# Patient Record
Sex: MALE | Race: WHITE
[De-identification: names, ages, dates, MRNs, and addresses within clinical notes are randomized per-mention and may not be internally consistent; named-entity substitution may affect disease eponyms.]

---

## 2017-05-31 ENCOUNTER — HOSPITAL ENCOUNTER (OUTPATIENT)
Dept: HOSPITAL 75 - RAD | Age: 42
End: 2017-05-31
Attending: ORTHOPAEDIC SURGERY
Payer: COMMERCIAL

## 2017-05-31 DIAGNOSIS — M23.242: ICD-10-CM

## 2017-05-31 DIAGNOSIS — M23.222: ICD-10-CM

## 2017-05-31 DIAGNOSIS — M23.262: Primary | ICD-10-CM

## 2017-05-31 DIAGNOSIS — M67.462: ICD-10-CM

## 2017-05-31 PROCEDURE — 73721 MRI JNT OF LWR EXTRE W/O DYE: CPT

## 2017-05-31 NOTE — DIAGNOSTIC IMAGING REPORT
PROCEDURE: MRI right joint lower extremity without contrast.



TECHNIQUE: Multiplanar, multisequence non contrast-enhanced MRI

of the right lower extremity was accomplished.



INDICATION: Right knee pain.



FINDINGS:

There is a small suprapatellar effusion. There is increased

signal in the popliteus muscle which could be posttraumatic

related to a sprain. There is no full-thickness tear. Abutting

the lateral aspect of the myotendinous junction of the popliteus,

there is a cystic area measuring 1.6 x 1 x 3 cm, which could be

sequela of the injury or ganglion cyst. The distal popliteus

tendon has normal insertion with increased signal, however,

probably related to prior injury.



The ACL demonstrates increased signal with fluid intensity mostly

proximally suggestive of mucinous degeneration and possible

element of old injury. The PCL demonstrates slight increased

signal and thickening also suggestive of old injury.



There is a complex tear involving the posterior horn of the

medial meniscus with a gap in the meniscus material measuring 3

mm in the posterior central aspect. The body of the medial

meniscus also demonstrates a nondisplaced tear. The lateral

meniscus demonstrates an oblique undersurface tear in the body of

the meniscus extending to the anterior horn. The posterior horn

appears intact. The lateral collateral ligament complex and the

MCL appear intact.



There is only mild cartilage thinning in the medial and to a

lesser extent in the lateral compartments with minimal cartilage

fissuring. Minimal reactive subchondral marrow signal abnormality

is seen in the lateral tibial plateau.



The extensor mechanism is intact. No Baker's cyst.



IMPRESSION:

1. Popliteus muscle sprain. There is a ganglion cyst abutting the

lateral aspect of the popliteus myotendinous junction.

2. Complex tears involving the posterior horn of the medial

meniscus. There are also tears in the medial meniscus body

segment and in the body and anterior horn of the lateral

meniscus. Other findings as above.



Dictated by: 



  Dictated on workstation # [id number]

## 2020-11-15 ENCOUNTER — HOSPITAL ENCOUNTER (EMERGENCY)
Dept: HOSPITAL 75 - ER | Age: 45
Discharge: HOME | End: 2020-11-15
Payer: COMMERCIAL

## 2020-11-15 VITALS — BODY MASS INDEX: 32.25 KG/M2 | HEIGHT: 74.02 IN | WEIGHT: 251.33 LBS

## 2020-11-15 VITALS — DIASTOLIC BLOOD PRESSURE: 87 MMHG | SYSTOLIC BLOOD PRESSURE: 141 MMHG
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
## 2020-11-15 DIAGNOSIS — S16.1XXA: Primary | ICD-10-CM

## 2020-11-15 LAB
ALBUMIN SERPL-MCNC: 4 GM/DL (ref 3.2–4.5)
ALP SERPL-CCNC: 64 U/L (ref 40–136)
ALT SERPL-CCNC: 89 U/L (ref 0–55)
APTT BLD: 31 SEC (ref 24–35)
BASOPHILS # BLD AUTO: 0.1 10^3/UL (ref 0–0.1)
BASOPHILS NFR BLD AUTO: 1 % (ref 0–10)
BILIRUB SERPL-MCNC: 0.8 MG/DL (ref 0.1–1)
BUN/CREAT SERPL: 8
CALCIUM SERPL-MCNC: 9.4 MG/DL (ref 8.5–10.1)
CHLORIDE SERPL-SCNC: 99 MMOL/L (ref 98–107)
CO2 SERPL-SCNC: 27 MMOL/L (ref 21–32)
CREAT SERPL-MCNC: 1 MG/DL (ref 0.6–1.3)
D DIMER PPP FEU-MCNC: 0.38 UG/ML (ref 0–0.49)
EOSINOPHIL # BLD AUTO: 0.1 10^3/UL (ref 0–0.3)
EOSINOPHIL NFR BLD AUTO: 1 % (ref 0–10)
GFR SERPLBLD BASED ON 1.73 SQ M-ARVRAT: > 60 ML/MIN
GLUCOSE SERPL-MCNC: 255 MG/DL (ref 70–105)
HCT VFR BLD CALC: 48 % (ref 40–54)
HGB BLD-MCNC: 15.7 G/DL (ref 13.3–17.7)
INR PPP: 1.1 (ref 0.8–1.4)
LYMPHOCYTES # BLD AUTO: 2.2 10^3/UL (ref 1–4)
LYMPHOCYTES NFR BLD AUTO: 20 % (ref 12–44)
MAGNESIUM SERPL-MCNC: 2.1 MG/DL (ref 1.6–2.4)
MANUAL DIFFERENTIAL PERFORMED BLD QL: NO
MCH RBC QN AUTO: 29 PG (ref 25–34)
MCHC RBC AUTO-ENTMCNC: 32 G/DL (ref 32–36)
MCV RBC AUTO: 90 FL (ref 80–99)
MONOCYTES # BLD AUTO: 0.9 10^3/UL (ref 0–1)
MONOCYTES NFR BLD AUTO: 8 % (ref 0–12)
NEUTROPHILS # BLD AUTO: 7.5 10^3/UL (ref 1.8–7.8)
NEUTROPHILS NFR BLD AUTO: 70 % (ref 42–75)
PLATELET # BLD: 250 10^3/UL (ref 130–400)
PMV BLD AUTO: 11.2 FL (ref 9–12.2)
POTASSIUM SERPL-SCNC: 4.1 MMOL/L (ref 3.6–5)
PROT SERPL-MCNC: 7.3 GM/DL (ref 6.4–8.2)
PROTHROMBIN TIME: 14.3 SEC (ref 12.2–14.7)
SODIUM SERPL-SCNC: 137 MMOL/L (ref 135–145)
WBC # BLD AUTO: 10.7 10^3/UL (ref 4.3–11)

## 2020-11-15 PROCEDURE — 93041 RHYTHM ECG TRACING: CPT

## 2020-11-15 PROCEDURE — 85025 COMPLETE CBC W/AUTO DIFF WBC: CPT

## 2020-11-15 PROCEDURE — 83874 ASSAY OF MYOGLOBIN: CPT

## 2020-11-15 PROCEDURE — 73030 X-RAY EXAM OF SHOULDER: CPT

## 2020-11-15 PROCEDURE — 85610 PROTHROMBIN TIME: CPT

## 2020-11-15 PROCEDURE — 85730 THROMBOPLASTIN TIME PARTIAL: CPT

## 2020-11-15 PROCEDURE — 80053 COMPREHEN METABOLIC PANEL: CPT

## 2020-11-15 PROCEDURE — 85379 FIBRIN DEGRADATION QUANT: CPT

## 2020-11-15 PROCEDURE — 83735 ASSAY OF MAGNESIUM: CPT

## 2020-11-15 PROCEDURE — 71045 X-RAY EXAM CHEST 1 VIEW: CPT

## 2020-11-15 PROCEDURE — 84484 ASSAY OF TROPONIN QUANT: CPT

## 2020-11-15 PROCEDURE — 36415 COLL VENOUS BLD VENIPUNCTURE: CPT

## 2020-11-15 NOTE — DIAGNOSTIC IMAGING REPORT
INDICATION: Chest pain.



Time of exam 2:20 PM



No prior studies are available for comparison.



Heart size is normal. Lungs are clear apart from a calcified

granuloma in the right lung base. There is no effusion or

pneumothorax.



IMPRESSION: No acute cardiopulmonary process is detected.



Dictated by: 



  Dictated on workstation # [id number]

## 2020-11-15 NOTE — ED GENERAL
General


Chief Complaint:  General Problems/Pain


Stated Complaint:  L SIDE SHOULDER/NECK PAIN AND SWELLING


Nursing Triage Note:  


Patient reports waking up yesterday morning with L shoulder, neck, back, and 


chest pain that is worse with movement. Patient states that it has got 


progressively worse. Took ASA to help with the pain but it did not relieve it. 


Denies SOA, fatigue, nausea


Nursing Sepsis Screen:  No Definite Risk





History of Present Illness


Date Seen by Provider:  Nov 15, 2020


Time Seen by Provider:  13:50


Initial Comments


This is a well-appearing 44-year-old male who presents for left-sided shoulder, 

chest, neck and back pain that is worse when he attempts to move his left arm or

when he takes deep breaths. States the pain began this morning upon awakening 

and has progressively worsened throughout the day. Reports 10 out of 10 pain 

with movement, sharp in nature, localized to the left upper chest wall and neck.

States he also has intermittent episodes of tingling in his fingers. Denies any 

strenuous physical activity, trauma, aggravating factors. Denies cough, 

shortness of breath, diaphoresis, nausea, vomiting, or abdominal pain.





Allergies and Home Medications


Allergies


Coded Allergies:  


     No Known Drug Allergies (Unverified , 10/31/12)





Home Medications


Cyclobenzaprine HCl 10 Mg Tablet, 10 MG PO TID PRN for PAIN-SEVERE (8-10)


   Prescribed by: TANYA DUQUE on 11/15/20 1603


Diclofenac Potassium 50 Mg Tablet, 50 MG PO TID, (Reported)





Patient Home Medication List


Home Medication List Reviewed:  Yes





Review of Systems


Review of Systems


Constitutional:  no symptoms reported


EENTM:  see HPI


Respiratory:  no symptoms reported


Cardiovascular:  see HPI


Gastrointestinal:  no symptoms reported


Genitourinary:  no symptoms reported


Musculoskeletal:  see HPI


Skin:  no symptoms reported


Psychiatric/Neurological:  No Symptoms Reported


Hematologic/Lymphatic:  No Symptoms Reported


Immunological/Allergic:  no symptoms reported





Past Medical-Social-Family Hx


Patient Social History


Alcohol Use:  Denies Use


Recreational Drug Use:  No


Recent Foreign Travel:  No


Contact w/Someone Who Travel:  No


Recent Infectious Disease Expo:  No





Past Medical History


Surgeries:  Yes


Orthopedic


Respiratory:  No


Cardiac:  No


Neurological:  No


Gastrointestinal:  No


Musculoskeletal:  No


Endocrine:  No


Integumentary:  No


Blood Disorders:  No





Physical Exam


Vital Signs





Vital Signs - First Documented








 11/15/20





 13:49


 


Temp 36.5


 


Pulse 116


 


Resp 18


 


B/P (MAP) 173/119 (137)


 


Pulse Ox 98





Capillary Refill : Less Than 3 Seconds


Height, Weight, BMI


Height: 6'2.00"


Weight: 285lbs. oz. 129.405389jg; 32.00 BMI


Method:


General Appearance:  No Apparent Distress, WD/WN


HEENT:  PERRL/EOMI, Pharynx Normal, Moist Mucous Membranes


Neck:  Supple, Limited Range of Motion; No Lymphadenopathy (L), No 

Lymphadenopathy (R); Tender Lateral, Other (mild swelling over left lower neck 

region, just above the clavicle. Limited range of motion due to guarding from 

pain. Pain is localized to left lateral side.)


Respiratory:  Lungs Clear, Normal Breath Sounds, No Accessory Muscle Use, No 

Respiratory Distress; No Rales; Other (tenderness to left upper chest wall with 

palpation)


Cardiovascular:  Regular Rate, Rhythm, No Edema, No Murmur, Normal Peripheral 

Pulses


Gastrointestinal:  Normal Bowel Sounds, Non Tender, Soft


Back:  Normal Inspection, No Vertebral Tenderness


Extremity:  Normal Inspection, Other (limited range of motion in left shoulder 

due to pain. Full range of motion to left elbow and wrist. )


Neurologic/Psychiatric:  Alert, Oriented x3, No Motor/Sensory Deficits, Normal 

Mood/Affect


Skin:  Normal Color, Warm/Dry





Progress/Results/Core Measures


Suspected Sepsis


Recent Fever Within 48 Hours:  No


Infection Criteria Present:  None


New/Unexplained  Altered Menta:  No


Sepsis Screen:  No Definite Risk


SIRS


Temperature: 


Pulse: 116 


Respiratory Rate: 18


 


Laboratory Tests


11/15/20 14:05: White Blood Count 10.7


Blood Pressure 173 /119 


Mean: 137


 


Laboratory Tests


11/15/20 14:05: 


Creatinine 1.00, INR Comment 1.1, Platelet Count 250, Total Bilirubin 0.8








Results/Orders


Lab Results





Laboratory Tests








Test


 11/15/20


14:05 11/15/20


14:55 Range/Units


 


 


White Blood Count


 10.7 


 


 4.3-11.0


10^3/uL


 


Red Blood Count


 5.41 


 


 4.30-5.52


10^6/uL


 


Hemoglobin 15.7   13.3-17.7  g/dL


 


Hematocrit 48   40-54  %


 


Mean Corpuscular Volume 90   80-99  fL


 


Mean Corpuscular Hemoglobin 29   25-34  pg


 


Mean Corpuscular Hemoglobin


Concent 32 


 


 32-36  g/dL





 


Red Cell Distribution Width 12.0   10.0-14.5  %


 


Platelet Count


 250 


 


 130-400


10^3/uL


 


Mean Platelet Volume 11.2   9.0-12.2  fL


 


Immature Granulocyte % (Auto) 0    %


 


Neutrophils (%) (Auto) 70   42-75  %


 


Lymphocytes (%) (Auto) 20   12-44  %


 


Monocytes (%) (Auto) 8   0-12  %


 


Eosinophils (%) (Auto) 1   0-10  %


 


Basophils (%) (Auto) 1   0-10  %


 


Neutrophils # (Auto)


 7.5 


 


 1.8-7.8


10^3/uL


 


Lymphocytes # (Auto)


 2.2 


 


 1.0-4.0


10^3/uL


 


Monocytes # (Auto)


 0.9 


 


 0.0-1.0


10^3/uL


 


Eosinophils # (Auto)


 0.1 


 


 0.0-0.3


10^3/uL


 


Basophils # (Auto)


 0.1 


 


 0.0-0.1


10^3/uL


 


Immature Granulocyte # (Auto)


 0.0 


 


 0.0-0.1


10^3/uL


 


Prothrombin Time 14.3   12.2-14.7  SEC


 


INR Comment 1.1   0.8-1.4  


 


Activated Partial


Thromboplast Time 31 


 


 24-35  SEC





 


D-Dimer


 0.38 


 


 0.00-0.49


UG/ML


 


Sodium Level 137   135-145  MMOL/L


 


Potassium Level 4.1   3.6-5.0  MMOL/L


 


Chloride Level 99     MMOL/L


 


Carbon Dioxide Level 27   21-32  MMOL/L


 


Anion Gap 11   5-14  MMOL/L


 


Blood Urea Nitrogen 8   7-18  MG/DL


 


Creatinine


 1.00 


 


 0.60-1.30


MG/DL


 


Estimat Glomerular Filtration


Rate > 60 


 


  





 


BUN/Creatinine Ratio 8    


 


Glucose Level 255 H    MG/DL


 


Calcium Level 9.4   8.5-10.1  MG/DL


 


Corrected Calcium 9.4   8.5-10.1  MG/DL


 


Magnesium Level 2.1   1.6-2.4  MG/DL


 


Total Bilirubin 0.8   0.1-1.0  MG/DL


 


Aspartate Amino Transf


(AST/SGOT) 39 H


 


 5-34  U/L





 


Alanine Aminotransferase


(ALT/SGPT) 89 H


 


 0-55  U/L





 


Alkaline Phosphatase 64     U/L


 


Myoglobin


 27.7 


 


 10.0-92.0


NG/ML


 


Troponin I < 0.028  < 0.028  <0.028  NG/ML


 


Total Protein 7.3   6.4-8.2  GM/DL


 


Albumin 4.0   3.2-4.5  GM/DL








My Orders





Orders - TANYA DUQUE


Cbc With Automated Diff (11/15/20 14:04)


Magnesium (11/15/20 14:04)


Chest 1 View, Ap/Pa Only (11/15/20 14:04)


Ekg Tracing (11/15/20 14:04)


Comprehensive Metabolic Panel (11/15/20 14:04)


Myoglobin Serum (11/15/20 14:04)


Protime With Inr (11/15/20 14:04)


Partial Thromboplastin Time (11/15/20 14:04)


Monitor-Rhythm Ecg Trace Only (11/15/20 14:04)


Ed Iv/Invasive Line Start (11/15/20 14:04)


Aspirin Chewable Tablet (Baby Aspirin Ch (11/15/20 14:15)


Shoulder, Left, 3 Views (11/15/20 14:09)


Troponin I (11/15/20 14:04)


Fibrin Degradation Products (11/15/20 14:04)


Ketorolac Injection (Toradol Injection) (11/15/20 15:15)


Orphenadrine Inj (Ed Only) (Norflex Inje (11/15/20 15:15)


Ketorolac Injection (Toradol Injection) (11/15/20 15:19)


Troponin I (11/15/20 15:23)





Medications Given in ED





Current Medications








 Medications  Dose


 Ordered  Sig/Gerard


 Route  Start Time


 Stop Time Status Last Admin


Dose Admin


 


 Aspirin  324 mg  ONCE  ONCE


 PO  11/15/20 14:15


 11/15/20 14:16 DC 11/15/20 14:34


324 MG


 


 Ketorolac


 Tromethamine  15 mg  ONCE  ONCE


 IVP  11/15/20 15:15


 11/15/20 15:16 DC 11/15/20 15:23


15 MG


 


 Orphenadrine


 Citrate  60 mg  ONCE  ONCE


 IM  11/15/20 15:15


 11/15/20 15:16 DC 11/15/20 15:23


60 MG








Vital Signs/I&O











 11/15/20 11/15/20





 13:49 16:10


 


Temp 36.5 


 


Pulse 116 88


 


Resp 18 18


 


B/P (MAP) 173/119 (137) 141/87


 


Pulse Ox 98 95





Capillary Refill : Less Than 3 Seconds








Blood Pressure Mean:                    137








Progress Note :  


Progress Note


Physical exam shows tenderness with palpation to left chest wall, neck and back,

which means this is likely muscle skeletal related. EKG and labs reviewed and 

are reassuring. Initial and repeat troponin negative. He received Toradol and 

Norflex, which did provide some relief. Discussed use of prednisone. However, 

with elevated blood pressure today opted to not prescribe steroids at this time.

Discussed following up with Dr. Barone if symptoms persisted and to return to

the ER if symptoms changed or worsened. Reviewed discharge plan of care and he 

is agreeable with plan.





ECG


Initial ECG Impression Date:  Nov 15, 2020


Initial ECG Impression Time:  14:33


Initial ECG Rate:  107


Initial ECG Rhythm:  S.Tach


Initial ECG Intervals:  Normal


Initial ECG Impression:  Normal





Diagnostic Imaging





   Diagonstic Imaging:  Xray


   Plain Films/CT/US/NM/MRI:  other (shoulder)


Comments


NAME:   CRYSTALNEDADELMY Amitree REC#:   [id number]


ACCOUNT#:   [id number]


PT STATUS:   REG ER


:   1975


PHYSICIAN:   TANYA DUQUE APRN


ADMIT DATE:   11/15/20/ER


                                  ***Signed***


Date of Exam:11/15/20





SHOULDER, LEFT, 3 VIEWS








Indication: Left shoulder pain started after waking up yesterday.





FINDINGS: 3 views of the left shoulder demonstrates no fracture


or dislocation. On the oblique view there is a small osteophyte


overlying the superior joint space. This is not seen on other


views.





IMPRESSION: There is osteophyte seen on the oblique view. This is


not seen on the other views and the joint space appears normal.





Dictated by: 





  Dictated on workstation # [id number]








Dict:   11/15/20 1425


Trans:   11/15/20 1434


[facility] 8920-1205





Interpreted by:     OLIVE GAONA MD


Electronically signed by: OLIVE GAONA MD 11/15/20 1434








   Diagonstic Imaging:  Xray


   Plain Films/CT/US/NM/MRI:  chest


Comments


NAME:   CONYDELMY Amitree REC#:   [id number]


ACCOUNT#:   [id number]


PT STATUS:   REG ER


:   1975


PHYSICIAN:   TANYA DUQUE APRN


ADMIT DATE:   11/15/20/ER


***Signed***


Date of Exam:11/15/20





CHEST 1 VIEW, AP/PA ONLY








INDICATION: Chest pain.





Time of exam 2:20 PM





No prior studies are available for comparison.





Heart size is normal. Lungs are clear apart from a calcified


granuloma in the right lung base. There is no effusion or


pneumothorax.





IMPRESSION: No acute cardiopulmonary process is detected.





Dictated by: 





  Dictated on workstation # [id number]








Dict:   11/15/20 1424


Trans:   11/15/20 143


[facility] 4650-4626





Interpreted by:     ANNA HERMOSILLO MD


Electronically signed by: ANNA HERMOSILLO MD 11/15/20 1437





Departure


Impression





   Primary Impression:  


   Acute strain of neck muscle


Disposition:  01 HOME, SELF-CARE


Condition:  Improved





Departure-Patient Inst.


Decision time for Depature:  16:02


Referrals:  


FLORY BARONE DO (PCP/Family)


Primary Care Physician


Patient Instructions:  Muscle Strain (DC)





Add. Discharge Instructions:  


Plan:


1. Discharge home. 


2. May take Tylenol or Ibuprofen as needed for pain per package instructions. 


3. Use ice 20 minutes at a time every couple hours to reduce pain and swelling. 


4.  Follow up with your primary care provider if your symptoms persist. 


5. May take Flexeril 10mg by mouth three times a day as needed. DO NOT DRIVE 

WHILE TAKING. 


4. Return for any new or concerning symptoms. 





All discharge instructions reviewed with patient and/or family. Voiced 

understanding.


Scripts


Cyclobenzaprine HCl (Cyclobenzaprine HCl) 10 Mg Tablet


10 MG PO TID PRN for PAIN-SEVERE (8-10) for 7 Days, #20 TAB 0 Refills


   Prov: TANYA DUQUE APRN         11/15/20











TANYA DUQUE APRN           Nov 15, 2020 15:37

## 2020-11-15 NOTE — DIAGNOSTIC IMAGING REPORT
Indication: Left shoulder pain started after waking up yesterday.



FINDINGS: 3 views of the left shoulder demonstrates no fracture

or dislocation. On the oblique view there is a small osteophyte

overlying the superior joint space. This is not seen on other

views.



IMPRESSION: There is osteophyte seen on the oblique view. This is

not seen on the other views and the joint space appears normal.



Dictated by: 



  Dictated on workstation # [id number]

## 2021-04-14 ENCOUNTER — HOSPITAL ENCOUNTER (OUTPATIENT)
Dept: HOSPITAL 75 - RAD | Age: 46
End: 2021-04-14
Attending: FAMILY MEDICINE
Payer: COMMERCIAL

## 2021-04-14 DIAGNOSIS — M47.812: Primary | ICD-10-CM

## 2021-04-14 PROCEDURE — 72050 X-RAY EXAM NECK SPINE 4/5VWS: CPT

## 2021-04-14 NOTE — DIAGNOSTIC IMAGING REPORT
INDICATION: Neck pain. Numbness and tingling of the arm.



COMPARISON: None



FINDINGS: Frontal and lateral radiographic views of the cervical

spine were obtained. Flexion and extension views in lateral

projection are also provided. Cervical spine is seen down to the

C7-T1 level on the lateral neutral view. On the lateral neutral

view, there is straightening of normal lordotic curvature. There

is however no significant jerry- or retrolisthesis. There is no

evidence of jumped facets. Flexion and extension views show no

abnormal translation.



Vertebral body heights are maintained. There is no acute

fracture. There are mild multilevel degenerative changes greatest

at C5-C6 and C6-C7 where there is intervertebral disc height loss

with anterior and posterior disc osteophyte complex formations.

Surrounding soft tissue structures are unremarkable. Included

portions of the lung apices are clear.



IMPRESSION:

1. No acute fracture dislocation in the cervical spine.

2. Mild multilevel degenerative changes greatest at C5-C6 and

C6-C7.



Dictated by: 



  Dictated on workstation # [id number]